# Patient Record
Sex: MALE | Race: WHITE | Employment: FULL TIME | URBAN - METROPOLITAN AREA
[De-identification: names, ages, dates, MRNs, and addresses within clinical notes are randomized per-mention and may not be internally consistent; named-entity substitution may affect disease eponyms.]

---

## 2018-08-16 ENCOUNTER — OFFICE VISIT (OUTPATIENT)
Dept: PRIMARY CARE CLINIC | Age: 33
End: 2018-08-16

## 2018-08-16 VITALS
DIASTOLIC BLOOD PRESSURE: 74 MMHG | TEMPERATURE: 98.4 F | BODY MASS INDEX: 27.23 KG/M2 | SYSTOLIC BLOOD PRESSURE: 122 MMHG | OXYGEN SATURATION: 98 % | HEIGHT: 66 IN | WEIGHT: 169.4 LBS | HEART RATE: 78 BPM

## 2018-08-16 DIAGNOSIS — H71.92 CHOLESTEATOMA OF LEFT EAR: ICD-10-CM

## 2018-08-16 DIAGNOSIS — K58.9 IRRITABLE BOWEL SYNDROME, UNSPECIFIED TYPE: ICD-10-CM

## 2018-08-16 DIAGNOSIS — M54.9 CHRONIC BACK PAIN, UNSPECIFIED BACK LOCATION, UNSPECIFIED BACK PAIN LATERALITY: Primary | ICD-10-CM

## 2018-08-16 DIAGNOSIS — G89.29 CHRONIC BACK PAIN, UNSPECIFIED BACK LOCATION, UNSPECIFIED BACK PAIN LATERALITY: Primary | ICD-10-CM

## 2018-08-16 PROCEDURE — G8427 DOCREV CUR MEDS BY ELIG CLIN: HCPCS | Performed by: FAMILY MEDICINE

## 2018-08-16 PROCEDURE — G8419 CALC BMI OUT NRM PARAM NOF/U: HCPCS | Performed by: FAMILY MEDICINE

## 2018-08-16 PROCEDURE — 4004F PT TOBACCO SCREEN RCVD TLK: CPT | Performed by: FAMILY MEDICINE

## 2018-08-16 PROCEDURE — 99204 OFFICE O/P NEW MOD 45 MIN: CPT | Performed by: FAMILY MEDICINE

## 2018-08-16 RX ORDER — METHOCARBAMOL 750 MG/1
750 TABLET, FILM COATED ORAL 4 TIMES DAILY
Qty: 60 TABLET | Refills: 0 | Status: SHIPPED | OUTPATIENT
Start: 2018-08-16 | End: 2019-02-06

## 2018-08-16 ASSESSMENT — PATIENT HEALTH QUESTIONNAIRE - PHQ9
SUM OF ALL RESPONSES TO PHQ QUESTIONS 1-9: 1
SUM OF ALL RESPONSES TO PHQ9 QUESTIONS 1 & 2: 1
2. FEELING DOWN, DEPRESSED OR HOPELESS: 1
SUM OF ALL RESPONSES TO PHQ QUESTIONS 1-9: 1
1. LITTLE INTEREST OR PLEASURE IN DOING THINGS: 0

## 2018-08-16 ASSESSMENT — ENCOUNTER SYMPTOMS
NAUSEA: 0
ABDOMINAL DISTENTION: 0
CONSTIPATION: 0
COUGH: 0
SHORTNESS OF BREATH: 0
WHEEZING: 0
ABDOMINAL PAIN: 0

## 2018-08-16 NOTE — PROGRESS NOTES
6.4 - 8.2 g/dL Final    Alb 12/17/2013 4.7  3.4 - 5.0 g/dL Final    Albumin/Globulin Ratio 12/17/2013 2.0  1.1 - 2.2 Final    Total Bilirubin 12/17/2013 0.3  0.0 - 1.0 mg/dL Final    Alkaline Phosphatase 12/17/2013 70  40 - 129 U/L Final    ALT 12/17/2013 38  10 - 40 U/L Final    AST 12/17/2013 23  15 - 37 U/L Final    Globulin 12/17/2013 2.3  g/dL Final    Hemoglobin A1C 12/17/2013 5.5  See comment % Final    Comment: Comment:                           Diagnosis of Diabetes: > or = 6.5%                           Increased risk of diabetes (Prediabetes): 5.7-6.4%                           Glycemic Control: Nonpregnant Adults: <7.0%                                             Pregnant: <6.0%                               eAG 12/17/2013 111.2  mg/dL Final    TSH 12/17/2013 2.40  0.27 - 4.20 uIU/mL Final    Vitamin D2 And D3, Total 12/17/2013 32.8  30.0 - 80.0 ng/mL Final    Comment: INTERPRETIVE INFORMATION: 25-HydroxyVitamin D2 and D3, Serum                                                      1-17 years:                           Deficiency: less than 20 ng/mL                           Optimum level: greater than or equal to 20 ng/mL*                           *(Leal CL Et al. Pediatrics 2008; 122: 1142-52.)                                                      18 years and older:                           Deficiency:  Less than 20 ng/mL                           Insufficiency:  20-29 ng/mL                           Optimum Level:  30-80 ng/mL                           Possible Toxicity:  Greater than 150 ng/mL                                                      Access complete set of age- and/or gender-specific                           reference intervals for this test in the [facility] Directory (Exo). Test developed and characteristics determined by [facility].  See Compliance Statement B: [facility]/                           [employer].[place]. Patent No. [id number]                           Performed by [facility],                           [place] [address], [address] [facility] [phone]                           [email or web address], Tariq Rodrigues MD - Lab.  Director                               Vitamin D3,25 Hydroxy 12/17/2013 32.8  ng/mL Final    Vitamin D2, 25 Hydroxy 12/17/2013 <1.0  ng/mL Final    Color, UA 12/17/2013 Yellow  Straw/Yellow Final    Clarity, UA 12/17/2013 Clear  Clear Final    Glucose, Ur 12/17/2013 Negative  Negative mg/dL Final    Bilirubin Urine 12/17/2013 Negative  Negative mg/dL Final    Ketones, Urine 12/17/2013 Negative  Negative mg/dL Final    Specific Gravity, UA 12/17/2013 1.015  1.005 - 1.030 Final    Blood, Urine 12/17/2013 Negative  Negative Final    pH, UA 12/17/2013 6.0  5.0 - 8.0 Final    Protein, UA 12/17/2013 Negative  Negative mg/dL Final    Urobilinogen, Urine 12/17/2013 0.2  <2.0 E.U./dL Final    Nitrite, Urine 12/17/2013 Negative  Negative Final    Leukocyte Esterase, Urine 12/17/2013 Negative  Negative Final    Microscopic Examination 12/17/2013 Not Indicated   Final    Urine Type 12/17/2013 CLEAN CATCH   Final    Cholesterol, Total 12/17/2013 217* 0 - 199 mg/dL Final    Triglycerides 12/17/2013 276* 0 - 150 mg/dL Final    HDL 12/17/2013 40  40 - 60 mg/dL Final    LDL Calculated 12/17/2013 122* <100 mg/dL Final    VLDL Cholesterol Calculated 12/17/2013 55  Not Established mg/dL Final       Family History   Problem Relation Age of Onset    Diabetes Father     Heart Disease Father         s/p stents    Cancer Mother         throat ca    Cancer Maternal Grandmother     Heart Failure Paternal Grandmother     Heart Failure Paternal Grandfather        Current Outpatient Prescriptions   Medication Sig Dispense Refill    methocarbamol (ROBAXIN-750) 750 MG tablet Take 1 tablet by mouth 4 times daily 60 tablet 0    cyclobenzaprine (FLEXERIL) 10 MG tablet Take 1 tablet by mouth 3 times daily as needed for Muscle spasms for up to 10 doses. 10 tablet 0    ibuprofen (ADVIL;MOTRIN) 600 MG tablet Take 1 tablet by mouth every 6 hours as needed for Pain. 12 tablet 0    furosemide (LASIX) 20 MG tablet Take 1 tablet by mouth daily. 60 tablet 3    atorvastatin (LIPITOR) 40 MG tablet Take 1 tablet by mouth daily. 30 tablet 3    naproxen (NAPROSYN) 500 MG tablet Take 1 tablet by mouth 2 times daily (with meals). 60 tablet 2     No current facility-administered medications for this visit. No Known Allergies    Review of Systems   Constitutional: Negative for fatigue. Respiratory: Negative for cough, shortness of breath and wheezing. Cardiovascular: Negative for chest pain and leg swelling. Gastrointestinal: Negative for abdominal distention, abdominal pain, constipation and nausea. Genitourinary: Negative for difficulty urinating. Musculoskeletal: Negative for arthralgias and myalgias. Neurological: Negative for dizziness, syncope, weakness, light-headedness and headaches. Psychiatric/Behavioral: Negative for dysphoric mood and sleep disturbance. The patient is not nervous/anxious. Vitals:  Vitals:    08/16/18 1638   BP: 122/74   Site: Right Arm   Position: Sitting   Cuff Size: Medium Adult   Pulse: 78   Temp: 98.4 °F (36.9 °C)   TempSrc: Oral   SpO2: 98%   Weight: 169 lb 6.4 oz (76.8 kg)   Height: 5' 6\" (1.676 m)     Body mass index is 27.34 kg/m². Wt Readings from Last 3 Encounters:   08/16/18 169 lb 6.4 oz (76.8 kg)   01/04/15 185 lb (83.9 kg)   12/17/13 169 lb 6.4 oz (76.8 kg)     BP Readings from Last 3 Encounters:   08/16/18 122/74   12/24/17 (!) 139/92   01/04/15 (!) 157/96        Physical Exam   Constitutional: He is oriented to person, place, and time. He appears well-developed and well-nourished. HENT:   Head: Normocephalic and atraumatic.    Right Ear: External ear normal.   Left Ear: External ear normal.   Ears:    Nose: Nose normal.   Mouth/Throat: Oropharynx is clear and moist.   Neck: Normal range of motion. No JVD present. No tracheal deviation present. No thyromegaly present. Cardiovascular: Normal rate, regular rhythm and normal heart sounds. No murmur heard. Pulmonary/Chest: Effort normal and breath sounds normal. No respiratory distress. He has no wheezes. Abdominal: Soft. Bowel sounds are normal. He exhibits no distension and no mass. There is no tenderness. Musculoskeletal: Normal range of motion. He exhibits tenderness. He exhibits no edema or deformity. Lumbar back: He exhibits tenderness, pain and spasm. He exhibits normal range of motion, no bony tenderness, no swelling, no edema, no deformity and no laceration. Back:    Neurological: He is alert and oriented to person, place, and time. He exhibits normal muscle tone. Coordination normal.   Skin: Skin is warm and dry. No erythema. Psychiatric: He has a normal mood and affect. His behavior is normal. Judgment and thought content normal.   Vitals reviewed. Assessment/Plan     Cheko Dang was seen today for established new doctor. Diagnoses and all orders for this visit:    Chronic back pain, unspecified back location, unspecified back pain laterality  -     XR LUMBAR SPINE (MIN 4 VIEWS); Future  -     XR THORACIC SPINE (3 VIEWS); Future  -     XR CERVICAL SPINE (2-3 VIEWS); Future  -     methocarbamol (ROBAXIN-750) 750 MG tablet; Take 1 tablet by mouth 4 times daily    Irritable bowel syndrome, unspecified type  -     Clance Lennox, MD ([facility])    Cholesteatoma of left ear  -     Aneudy Lai MD ([place])    Pt to return fasting to office at next appt for further evaluations of other issues    Return in about 4 weeks (around 9/13/2018) for chronic back pain.

## 2018-08-16 NOTE — PATIENT INSTRUCTIONS
Cigarette Smoking and Its Health Risks   GENERAL INFORMATION:   Smoking and your health: Cigarette smoking is the most preventable cause of illness and death in the [place]. A large number of Americans smoke cigarettes, and each year more than one million children and adults start smoking cigarettes. Many people die every year from illnesses caused by smoking. People who smoke die earlier than those who do not smoke. The risk of disease increases if you smoke a lot, inhale deeply, or have smoked many years. Why are cigarettes bad for you? Cigarettes are filled with poison that goes into the lungs when you inhale. Coughing, dizziness, and burning of the eyes, nose, and throat are early signs that smoking is harming you. Smoking increases your health risks if you have diabetes, high blood pressure, or high blood cholesterol. The long-term problems of smoking cigarettes are the following:   Cancer: Smoking increases your chances of getting cancer. Cigarette smoking may play a role in developing many kinds of cancer. Lung cancer is the most common kind of cancer caused by smoking. A smoker is at greater risk of getting cancer of the lips, mouth, throat, or voice box. Smokers also have a higher risk of getting esophagus, stomach, kidney, pancreas, cervix, bladder, and skin cancer. Heart and blood vessel disease: If you already have heart or blood vessel problems and smoke, you are at even greater risk of having continued or worse health problems. The nicotine in the tobacco causes an increase in your heart rate and blood pressure. The arteries (blood vessels) in your arms and legs tighten and narrow because of the nicotine in cigarette smoke. Cigarette smoke increases blood clotting, and may damage the lining of your heart's arteries and other blood vessels. Carbon monoxide is a harmful gas that gets into the blood and decreases oxygen going to the heart and the body. Cigarette smoke contains this gas. a better chance of having a healthy baby. You will decrease the health risks of nonsmokers if you stop smoking. By stopping smoking you will also save money. What is the best way to stop smoking? A large percentage of people have tried to quit smoking at least once. Most people who try to quit smoking go through a series of stages. Following are the stages you may go through to stop smoking: Thinking about quitting. Deciding to quit on a certain day. Quitting smoking. Successfully staying an ex-smoker. You must be strong in order to quit smoking. When you decide to quit, you can get help from your caregiver or others. You will learn that there are many ways to stop smoking. Talk to your caregiver about the best method for you when you are ready to quit smoking. Ask your caregiver for more information about how to stop smoking. Call or write the following for more information about the risks of smoking. Smokefree. gov  Phone: 1-878.866.7417  Web Address: www.smokefree. gov  American [employer]  [address] [place] [address],[address]. [address]  Phone: 2-8-660.970.7864  Phone: 9-1-925--719-5042  Web Address: [employer] [address] [place] [address]  Phone: 1-385.388.8188  Web Address: http://[employer]/. gov   CARE AGREEMENT:   You have the right to help plan your care. To help with this plan, you must learn about your health condition and how it may be treated. You can then discuss treatment options with your caregivers. Work with them to decide what care may be used to treat you. You always have the right to refuse treatment. Copyright © 2009. [employer] All rights reserved. Information is for End User's use only and may not be sold, redistributed or otherwise used for commercial purposes. The above information is an  only. It is not intended as medical advice for individual conditions or treatments.  Talk to your doctor, nurse or pharmacist

## 2018-08-20 ENCOUNTER — HOSPITAL ENCOUNTER (OUTPATIENT)
Dept: OTHER | Age: 33
Discharge: HOME | End: 2018-08-20
Attending: FAMILY MEDICINE | Admitting: FAMILY MEDICINE

## 2018-08-20 DIAGNOSIS — G89.29 CHRONIC BACK PAIN, UNSPECIFIED BACK LOCATION, UNSPECIFIED BACK PAIN LATERALITY: ICD-10-CM

## 2018-08-20 DIAGNOSIS — M54.9 CHRONIC BACK PAIN, UNSPECIFIED BACK LOCATION, UNSPECIFIED BACK PAIN LATERALITY: ICD-10-CM

## 2018-08-22 DIAGNOSIS — M47.816 SPONDYLOSIS OF LUMBAR SPINE: Primary | ICD-10-CM

## 2019-02-06 ENCOUNTER — OFFICE VISIT (OUTPATIENT)
Dept: PRIMARY CARE CLINIC | Age: 34
End: 2019-02-06
Payer: COMMERCIAL

## 2019-02-06 VITALS
OXYGEN SATURATION: 97 % | RESPIRATION RATE: 16 BRPM | HEART RATE: 74 BPM | BODY MASS INDEX: 28.73 KG/M2 | HEIGHT: 66 IN | WEIGHT: 178.8 LBS | SYSTOLIC BLOOD PRESSURE: 120 MMHG | DIASTOLIC BLOOD PRESSURE: 74 MMHG | TEMPERATURE: 98.4 F

## 2019-02-06 DIAGNOSIS — H91.93 BILATERAL HEARING LOSS, UNSPECIFIED HEARING LOSS TYPE: ICD-10-CM

## 2019-02-06 DIAGNOSIS — E55.9 VITAMIN D DEFICIENCY: ICD-10-CM

## 2019-02-06 DIAGNOSIS — R53.83 OTHER FATIGUE: ICD-10-CM

## 2019-02-06 DIAGNOSIS — E78.5 HYPERLIPIDEMIA, UNSPECIFIED HYPERLIPIDEMIA TYPE: ICD-10-CM

## 2019-02-06 DIAGNOSIS — Z23 NEEDS FLU SHOT: ICD-10-CM

## 2019-02-06 DIAGNOSIS — Z13.1 DIABETES MELLITUS SCREENING: ICD-10-CM

## 2019-02-06 DIAGNOSIS — Z00.00 WELL ADULT EXAM: Primary | ICD-10-CM

## 2019-02-06 DIAGNOSIS — Z72.0 TOBACCO ABUSE: ICD-10-CM

## 2019-02-06 DIAGNOSIS — Z23 NEED FOR VACCINATION AGAINST STREPTOCOCCUS PNEUMONIAE: ICD-10-CM

## 2019-02-06 PROCEDURE — 99395 PREV VISIT EST AGE 18-39: CPT | Performed by: FAMILY MEDICINE

## 2019-02-06 PROCEDURE — G8484 FLU IMMUNIZE NO ADMIN: HCPCS | Performed by: FAMILY MEDICINE

## 2019-02-06 RX ORDER — VARENICLINE TARTRATE 1 MG/1
1 TABLET, FILM COATED ORAL 2 TIMES DAILY
Qty: 60 TABLET | Refills: 5 | Status: SHIPPED | OUTPATIENT
Start: 2019-02-06

## 2019-02-06 RX ORDER — VARENICLINE TARTRATE 0.5 MG/1
.5-1 TABLET, FILM COATED ORAL SEE ADMIN INSTRUCTIONS
Qty: 57 TABLET | Refills: 0 | Status: SHIPPED | OUTPATIENT
Start: 2019-02-06

## 2019-02-06 ASSESSMENT — ENCOUNTER SYMPTOMS
DIARRHEA: 0
WHEEZING: 0
ABDOMINAL DISTENTION: 0
SORE THROAT: 0
TROUBLE SWALLOWING: 0
SINUS PRESSURE: 0
RECTAL PAIN: 0
RHINORRHEA: 0
CONSTIPATION: 0
EYE REDNESS: 1
BACK PAIN: 1
NAUSEA: 0
SHORTNESS OF BREATH: 0
ABDOMINAL PAIN: 0
BLOOD IN STOOL: 1
COUGH: 0
EYE ITCHING: 1
VOICE CHANGE: 1

## 2019-02-06 ASSESSMENT — PATIENT HEALTH QUESTIONNAIRE - PHQ9
2. FEELING DOWN, DEPRESSED OR HOPELESS: 0
SUM OF ALL RESPONSES TO PHQ QUESTIONS 1-9: 0
SUM OF ALL RESPONSES TO PHQ9 QUESTIONS 1 & 2: 0
SUM OF ALL RESPONSES TO PHQ QUESTIONS 1-9: 0
1. LITTLE INTEREST OR PLEASURE IN DOING THINGS: 0